# Patient Record
Sex: FEMALE | URBAN - METROPOLITAN AREA
[De-identification: names, ages, dates, MRNs, and addresses within clinical notes are randomized per-mention and may not be internally consistent; named-entity substitution may affect disease eponyms.]

---

## 2021-05-24 ENCOUNTER — APPOINTMENT (OUTPATIENT)
Dept: URBAN - METROPOLITAN AREA CLINIC 254 | Age: 21
Setting detail: DERMATOLOGY
End: 2021-05-24

## 2021-05-24 VITALS — HEIGHT: 61 IN | RESPIRATION RATE: 16 BRPM | WEIGHT: 151 LBS

## 2021-05-24 DIAGNOSIS — L53.1 ERYTHEMA ANNULARE CENTRIFUGUM: ICD-10-CM

## 2021-05-24 PROBLEM — L30.9 DERMATITIS, UNSPECIFIED: Status: ACTIVE | Noted: 2021-05-24

## 2021-05-24 PROCEDURE — OTHER PRESCRIPTION: OTHER

## 2021-05-24 PROCEDURE — 99203 OFFICE O/P NEW LOW 30 MIN: CPT

## 2021-05-24 PROCEDURE — OTHER COUNSELING: OTHER

## 2021-05-24 PROCEDURE — OTHER KOH PREP: OTHER

## 2021-05-24 RX ORDER — TRIAMCINOLONE ACETONIDE 1 MG/G
0.1% CREAM TOPICAL BID X 2 WEEKS
Qty: 1 | Refills: 0 | Status: ERX | COMMUNITY
Start: 2021-05-24

## 2021-05-24 ASSESSMENT — LOCATION DETAILED DESCRIPTION DERM
LOCATION DETAILED: RIGHT ANTERIOR PROXIMAL THIGH
LOCATION DETAILED: RIGHT PERIAREOLAR BREAST 10-11:00 REGION
LOCATION DETAILED: RIGHT LATERAL BREAST 10-11:00 REGION

## 2021-05-24 ASSESSMENT — LOCATION SIMPLE DESCRIPTION DERM
LOCATION SIMPLE: RIGHT THIGH
LOCATION SIMPLE: RIGHT BREAST

## 2021-05-24 ASSESSMENT — LOCATION ZONE DERM
LOCATION ZONE: TRUNK
LOCATION ZONE: LEG

## 2021-05-24 NOTE — PROCEDURE: KOH PREP
Detail Level: Detailed
Cpt Desired: No Billing
Koh Intro Text (From The.....): A KOH prep was ordered and evaluated from the
Koh Procedure Text (Tissue Harvesting Technique): A 15-blade scalpel was used to scrape the skin. The skin scrapings were placed on a glass slide, covered with a coverslip and a KOH solution was applied.
Showing: negative findings within normal realm

## 2021-05-24 NOTE — PROCEDURE: COUNSELING
Patient Specific Counseling (Will Not Stick From Patient To Patient): Would plan to Rx fluconazole if patient calls to report this is not improved.\\nBiopsy should be done if Rx’s do not resolve this completely.\\nPatient says she is from [place] and her insurance is based there, making us out of network. She says if Bx is necessary, she’d return to [place] for this.
Detail Level: Detailed

## 2021-05-24 NOTE — HPI: RASH
Is This A New Presentation, Or A Follow-Up?: Rash
Additional History: History a history of “ring work”. Has used oral and topical terbinafine which helped but did not go away completely. Also used nystatin but did not help. She used terbinafine for a month.